# Patient Record
Sex: MALE | Race: WHITE
[De-identification: names, ages, dates, MRNs, and addresses within clinical notes are randomized per-mention and may not be internally consistent; named-entity substitution may affect disease eponyms.]

---

## 2022-07-01 ENCOUNTER — HOSPITAL ENCOUNTER (OUTPATIENT)
Dept: HOSPITAL 97 - OR | Age: 27
Discharge: HOME | End: 2022-07-01
Attending: OTOLARYNGOLOGY
Payer: COMMERCIAL

## 2022-07-01 VITALS — OXYGEN SATURATION: 98 %

## 2022-07-01 VITALS — SYSTOLIC BLOOD PRESSURE: 153 MMHG | TEMPERATURE: 96.8 F | DIASTOLIC BLOOD PRESSURE: 99 MMHG

## 2022-07-01 DIAGNOSIS — Z20.822: ICD-10-CM

## 2022-07-01 DIAGNOSIS — J35.8: Primary | ICD-10-CM

## 2022-07-01 DIAGNOSIS — G47.33: ICD-10-CM

## 2022-07-01 PROCEDURE — 0CBNXZZ EXCISION OF UVULA, EXTERNAL APPROACH: ICD-10-PCS

## 2022-07-01 PROCEDURE — 36415 COLL VENOUS BLD VENIPUNCTURE: CPT

## 2022-07-01 PROCEDURE — 87811 SARS-COV-2 COVID19 W/OPTIC: CPT

## 2022-07-01 PROCEDURE — 88304 TISSUE EXAM BY PATHOLOGIST: CPT

## 2022-07-01 PROCEDURE — 0CTPXZZ RESECTION OF TONSILS, EXTERNAL APPROACH: ICD-10-PCS

## 2022-07-01 PROCEDURE — 42826 REMOVAL OF TONSILS: CPT

## 2022-07-01 PROCEDURE — 42140 EXCISION OF UVULA: CPT

## 2022-07-01 RX ADMIN — HYDROMORPHONE HYDROCHLORIDE ONE MG: 1 INJECTION, SOLUTION INTRAMUSCULAR; INTRAVENOUS; SUBCUTANEOUS at 12:07

## 2022-07-01 RX ADMIN — HYDROMORPHONE HYDROCHLORIDE ONE MG: 1 INJECTION, SOLUTION INTRAMUSCULAR; INTRAVENOUS; SUBCUTANEOUS at 12:14

## 2022-07-01 RX ADMIN — BUPIVACAINE HYDROCHLORIDE AND EPINEPHRINE ONE ML: 5; 5 INJECTION, SOLUTION EPIDURAL; INTRACAUDAL; PERINEURAL at 11:33

## 2022-07-01 RX ADMIN — BUPIVACAINE HYDROCHLORIDE AND EPINEPHRINE ONE ML: 5; 5 INJECTION, SOLUTION EPIDURAL; INTRACAUDAL; PERINEURAL at 10:48

## 2022-07-01 NOTE — P.OP
Date of Service: 07/01/22


Preoperative diagnosis: [Obstructive Sleep Apnea,] [Tonsil hypertrophy,]  

[Chronic tonsillitis,] [Tonsillolithiasis]





Postoperative diagnosis: [Same]





Procedure: Tonsillectomy and uvulectomy





Surgeon: Comfort Samuels MD


Assistant: None





Anesthesia: General via endotracheal tube





IV fluids: 800 ml crystalloid





Estimated blood loss: Minimal, less than 5 mL





Specimen: uvula and [bilateral tonsils]





Findings: Deep chronically inflamed crypts with tonsil stones





Implants: None





Indication: patient with persistent symptoms and findings in spite of good 

medical management.





Details of operation: 


     The patient was brought to the operating room and placed under general 

anesthesia via oral endotracheal tube.  The head of bed was turned 90 degrees.  

A shoulder roll was placed and the neck was extended.  A head drape was applied.

 The McIvor mouthgag was placed and suspended from the Kendall stand.  The oxygen 

concentration was confirmed with the anesthesiologist and was less than 40%.  

Weight-based dexamethasone was administered by the anesthesiologist.  The soft 

palate was palpated and there was no submucous cleft.  A red rubber catheter was

placed in the nose and the tip withdrawn through the mouth and secured to the 

head drape for retraction of the soft palate.  The tonsils were noted to be 

moderately enlarged with deep crypts, chronic inflammation and tonsil stones.  

The left tonsil was grasped with a straight Allis clamp.  The Bovie 

electrocautery was used to incise the mucosa over the anterior pillar and 

identified the tonsillar capsule.  The tonsil was dissected using cautery and 

blunt dissection until free from soft tissue attachments.  A tonsil ball was 

placed to aid in hemostasis.  In the lower midportion of the pole, there was an 

area of bleeding that was ligated with a Vicryl Endoloop. Then the right tonsil 

was removed in a similar manner.  No suture was necessary on the right side.


   The red rubber catheter was then released and removed.  The tip of the uvula 

was grasped using a DeBakey forceps and the spatula tip cautery was used to 

remove the lower two thirds of the uvula.  The cut edge was left open to heal by

secondary intention.


      The tonsillar fossea and remaining stump of uvula were injected with 0.5% 

Marcaine with epinephrine; a total of 1.5 milliliters was used. The oropharynx 

was irrigated with cold saline.  After suctioning, a [place] sump orogastric tube 

was passed for decompression of the stomach.  The red rubber catheter was 

removed and used to suction the oropharynx, nasopharynx, and nasal cavities.  

The McIvor mouthgag was removed.  There was no evidence of injury to the teeth, 

lips, or tongue.  The mandible was mobile.


     The patient was then awakened from anesthesia and extubated in the 

operating room, taken to the recovery room in stable condition.





Disposition: The patient will be discharged home later today in the care of 

their family with written postoperative instructions and appropriate pain 

medications.  They will follow-up in Dr. Samuels's office in approximately 1 

month.  They are instructed to contact Dr. Samuels's office for any bleeding or 

other concerns.